# Patient Record
Sex: FEMALE | Race: WHITE | Employment: FULL TIME | URBAN - METROPOLITAN AREA
[De-identification: names, ages, dates, MRNs, and addresses within clinical notes are randomized per-mention and may not be internally consistent; named-entity substitution may affect disease eponyms.]

---

## 2024-10-29 ENCOUNTER — LAB ENCOUNTER (OUTPATIENT)
Dept: LAB | Facility: HOSPITAL | Age: 34
End: 2024-10-29
Attending: NURSE PRACTITIONER
Payer: COMMERCIAL

## 2024-10-29 DIAGNOSIS — Z01.818 PRE-OP TESTING: ICD-10-CM

## 2024-10-29 LAB
ANTIBODY SCREEN: NEGATIVE
BASOPHILS # BLD AUTO: 0.02 X10(3) UL (ref 0–0.2)
BASOPHILS NFR BLD AUTO: 0.3 %
DEPRECATED RDW RBC AUTO: 41.7 FL (ref 35.1–46.3)
EOSINOPHIL # BLD AUTO: 0.19 X10(3) UL (ref 0–0.7)
EOSINOPHIL NFR BLD AUTO: 2.5 %
ERYTHROCYTE [DISTWIDTH] IN BLOOD BY AUTOMATED COUNT: 11.9 % (ref 11–15)
HCT VFR BLD AUTO: 36.9 %
HGB BLD-MCNC: 12.1 G/DL
IMM GRANULOCYTES # BLD AUTO: 0.01 X10(3) UL (ref 0–1)
IMM GRANULOCYTES NFR BLD: 0.1 %
LYMPHOCYTES # BLD AUTO: 3.21 X10(3) UL (ref 1–4)
LYMPHOCYTES NFR BLD AUTO: 42 %
MCH RBC QN AUTO: 30.8 PG (ref 26–34)
MCHC RBC AUTO-ENTMCNC: 32.8 G/DL (ref 31–37)
MCV RBC AUTO: 93.9 FL
MONOCYTES # BLD AUTO: 0.59 X10(3) UL (ref 0.1–1)
MONOCYTES NFR BLD AUTO: 7.7 %
NEUTROPHILS # BLD AUTO: 3.62 X10 (3) UL (ref 1.5–7.7)
NEUTROPHILS # BLD AUTO: 3.62 X10(3) UL (ref 1.5–7.7)
NEUTROPHILS NFR BLD AUTO: 47.4 %
PLATELET # BLD AUTO: 259 10(3)UL (ref 150–450)
RBC # BLD AUTO: 3.93 X10(6)UL
RH BLOOD TYPE: POSITIVE
WBC # BLD AUTO: 7.6 X10(3) UL (ref 4–11)

## 2024-10-29 PROCEDURE — 36415 COLL VENOUS BLD VENIPUNCTURE: CPT

## 2024-10-29 PROCEDURE — 86850 RBC ANTIBODY SCREEN: CPT

## 2024-10-29 PROCEDURE — 86901 BLOOD TYPING SEROLOGIC RH(D): CPT

## 2024-10-29 PROCEDURE — 86900 BLOOD TYPING SEROLOGIC ABO: CPT

## 2024-10-29 PROCEDURE — 85025 COMPLETE CBC W/AUTO DIFF WBC: CPT

## 2024-10-29 NOTE — DISCHARGE INSTRUCTIONS
Restrictions -  Pelvic rest for 1 week - no intercourse, tampon use, baths or swimming    For pain -   Use Motrin/Advil up to 600mg every 6 hours  Can also use Tylenol 500-1000mg every 6 hours       Expect light to medium vaginal bleeding/watery yellowish discharge for up to 7-10 days    Call office at [phone] if any fevers, foul smelling discharge from incisions, heavy vaginal bleeding or concerns.               CIRUGIA AMBULATORIA: INSTRUCCIONES DESPUES DE BARON RECIBIDO ANESTESIA  Debido a la Anestesia y a las medicamentos que se le aplicaron chichi la cirugia, jina reflejos y capacidaddiscernimiento pueden verse afectados. Tambien podria tener un poco de mareo.Aparte de siguir las precauciones de sentico comun, le recomendamos lo siguiente:     El paciente debe estar acompañado por alguien hasta la mañana siguiente.     No maneje ningun vehiculo automotor ni monte bicicleta.     No tome ninguna decision importante melanie por ejemplo firmar de documentos importantes.     No opere herramientas electricas ni electrodomesticos, tales melanie cuchillos electricos, batidoras electricas o serruchos electricos. No kvng el cesped con cortadoras electricas. No practique deportes.     No anthony ejercicio.     Para evitar las nauseas, coma menos de lo normal mas o menos la mitad de lo habitual y/o juliocesar solo liquidos hasta la manana siguiente. Consulte con stanley doctor si esta llevando ángel dieta especial.     No tome bebidas alcoholicas, tranquilizantes, pastilles para dormir etc., y verifique con stanley doctor acerca de cualquier medicamento que kapil tomando actualmente.     El efecto de la medicación usada en stanley anestesia habra pasado delores por completo a la medianoche. Por lo tanto, puede reanudar jina habitos cotidianos en la mañana.     Los adultos deben descansar lo brandy posible por las siguientes 24 horas. Los niños deben permanecer en cama lo brandy posible por las siguientes 24 horas.     Si se presenta cualquier problema,  puede llamar a stanley propio doctor personal o aceda al centro de [facility].    Si sigue estas instrucciones, se sentira major y estara mas seguro despues de stanley cirugia ambulatoria. Sitiene cualquier pregunta, llame al hospital y pida que lo comuniquen con la enfermera de cirugia [phone], extension [phone].    Instrucciones para el patsy: después de stanley cirugía   Acaba de someterse a ángel cirugía. Chichi la cirugía, le administraron un tipo de medicamento llamado anestesia para que esté relajado y no sienta dolor. Después de la cirugía, malcolm vez sienta algo de dolor o náuseas. [place] es común. Estos son algunos consejos para sentirse mejor y recuperarse gale después de la cirugía.   El regreso a casa  Stanley proveedor de atención médica le enseñará cómo cuidarse cuando regrese a stanley casa. También responderá jina preguntas. Pida a un familiar o amigo adulto que lo conduzca a stanley casa. Chichi las primeras 24 horas después de la cirugía, siga estas recomendaciones:   No conduzca ni use maquinaria pesada.  No tome decisiones importantes ni firme ningún documento legal.  Adminístrese los medicamentos según las indicaciones.  Evite el consumo de alcohol.  Si es necesario, coordine para que alguien se quede con usted. Esta persona puede vigilar cualquier problema que se presente y lo ayudará a permanecer seguro.  Asegúrese de asistir a todas jina visitas de control con stanley proveedor de atención médica. Y descanse después de la cirugía chichi el tiempo que le indique stanley proveedor.   Cómo sobrellevar el dolor  Si siente dolor después de la cirugía, los analgésicos lo ayudarán a sentirse mejor. [place] los analgésicos según las indicaciones, antes de que el dolor se intensifique. Además, pregunte a stanley proveedor de atención médica o al farmacéutico acerca de otras formas de controlar el dolor. Estas podrían incluir aplicar calor o hielo, o hacer ejercicios de relajación. Y siga todas las  instrucciones que le dé stanley cirujano o enfermero.      [place]um[place]a el cronograma de jina medicamentos.     Consejos para levon analgésicos  Para aliviar el dolor lo steffanie posible, recuerde estos puntos:   Los analgésicos pueden causar malestar estomacal. Tomarlos con un poco de comida puede aliviar celestina efecto.  La mayoría de los calmantes que se teri por la boca necesitan por lo menos de 20 a 30 minutos para surtir efecto.  No espere hasta que stanley dolor se vuelva intenso para levon el analgésico que le indicaron. Intente que el momento en que puede levon stanley medicamento coincida con otra actividad. Celestina podría ser el momento antes de vestirse, salomón un paseo o sentarse a la myers para cenar.  El estreñimiento es un efecto secundario frecuente de algunos analgésicos. Consulte a stanley proveedor de atención médica antes de usar cualquier medicamento, melanie laxantes o ablandadores de heces, para ayudar a aliviar el estreñimiento. También consulte si es preciso evitar algún tipo de alimento. Levon mucha cantidad de líquido y comer alimentos melanie frutas y verduras con alto contenido de fibra también puede ser beneficioso. Recuerde que no debe levon laxantes a menos que stanley cirujano se los indique.  Mezclar bebidas alcohólicas y analgésicos puede causar mareos y enlentecer stanley respiración. Y hasta puede ser mortal. No juliocesar alcohol mientras esté tomando calmantes.  Los analgésicos pueden hacer que tenga reacciones más lentas. No conduzca ni opere maquinaria mientras esté tomando analgésicos.  Stanley proveedor de atención médica puede indicarle que tome acetaminofén (paracetamol) para ayudar a aliviar el dolor. Pregúntele qué cantidad debe levon por día. El acetaminofén y otros analgésicos pueden interactuar con jina medicamentos recetados u otros medicamentos de venta jitendra (OTC, por jina siglas en inglés). Algunos medicamentos recetados contienen acetaminofén y otros ingredientes. Combinar medicamentos recetados y acetaminofén de venta jitendra  para aliviar el dolor puede provocarle ángel sobredosis accidental. [place] atentamente la etiqueta del envase de jina medicamentos OTC. [place] lo ayudará a saber con exactitud la lista de ingredientes, la cantidad que debe marjorie y cualquier advertencia. [place] también puede ayudarlo a evitar marjorie demasiado acetaminofén. Si tiene preguntas o no entiende la información, pídale a stanley farmacéutico o proveedor de atención médica que se la explique antes de marjorie el medicamento OTC.   Manejo de las náuseas  Algunas personas pueden sentir malestar estomacal (náuseas) después de la cirugía. [place] suele suceder debido a la anestesia, el dolor, los analgésicos, la disminución del movimiento de la comida en el estómago o el estrés de la cirugía. Estos consejos lo ayudarán a manejar las náuseas y a comer alimentos más saludables mientras se recupera. Si seguía un plan alimentario especial antes de la cirugía, pregúntele a stanley proveedor de atención médica si debe continuarlo mientras se recupera. Consulte con stanley proveedor cómo debería continuar stanley alimentación. Esta puede variar según el tipo de cirugía a la que se sometió. Los siguientes consejos generales pueden serle útiles:   No se fuerce a comer. [place] por stanley cuerpo para saber cuándo comer y qué cantidad.  Comience con líquidos transparentes y sopa. Estos son más fáciles de digerir.  Tan pronto melanie se sienta listo, intente comer alimentos semisólidos. Estos incluyen puré de babita, puré de manzana y gelatina.  Lentamente, pase a alimentos sólidos. Al principio no coma alimentos grasosos, pesados ni condimentados.  No se fuerce a hacer aamir comidas grandes al día. En cambio, coma cantidades pequeñas, paul con mayor frecuencia.  [place] los analgésicos con ángel pequeña cantidad de alimentos sólidos, melanie galletas saladas o ángel tostada. [place] ayuda a prevenir las náuseas.  Cuándo llamar a stanley proveedor de atención médica   Llame de inmediato a stanley proveedor de atención médica si nota alguno de  los siguientes síntomas:   Sigue teniendo mucho dolor, o el dolor empeora, después de marjorie el medicamento. Puede que el medicamento no sea lo suficientemente kenney. O gale, puede annetta complicaciones de la cirugía.  Se siente demasiado somnoliento, mareado o adormecido. Quizás el medicamento sea demasiado kenney.  Tiene efectos secundarios, melanie náuseas o vómitos. Stanley proveedor de atención médica puede recomendarle marjorie otros medicamentos.  Tiene cambios en la piel, melanie sarpullido, picazón o urticaria. [place] puede significar que tiene ángel reacción alérgica. Stanley proveedor puede recomendarle marjorie otros medicamentos.  La incisión tiene un aspecto diferente (por ejemplo, se abre ángel parte).  Tiene sangrado o supuración de líquido de la herida y no le dijeron que eso era esperable.  Fiebre de 100.4 °F (38 °C) o más, o según le indique stanley proveedor.  Cuándo llamar al 911  Llame al  911  de inmediato si tiene:   Dificultad para respirar  Teressa hinchada    Si tiene apnea del sueño obstructiva   [place] la cirugía, le administraron anestesia para que esté cómodo y no sienta dolor. Después de la cirugía, es probable que tenga más ataques de apnea causados por la anestesia y otros medicamentos que le administraron. Los ataques pueden durar más de lo habitual.    En stanley casa, anthony lo siguiente:  Cuando duerma, siga usando stanley dispositivo de presión positiva continua en las vías respiratorias (CPAP, por jina siglas en inglés). A menos que stanley proveedor de atención médica le indique lo contrario, úselo siempre que duerma, ya sea de día o de noche. El dispositivo de CPAP suele usarse para tratar la apnea obstructiva del sueño.  Consulte a stanley proveedor antes de marjorie cualquier analgésico, relajante muscular o sedante. Stanley proveedor le dará información sobre los peligros de marjorie estos medicamentos.  Comuníquese con stanley proveedor si tiene el sueño demasiado alterado, incluso cuando esté tomando los medicamentos según las  instrucciones.  © 6819-0667 [employer]. Todos los derechos reservados. Esta información no pretende sustituir la atención médica profesional. Sólo stanley médico puede diagnosticar y tratar un problema de pan.

## 2024-10-29 NOTE — PAT NURSING NOTE
Spoke with Helen at surgeon office regarding patient desire to change surgery date. Office will follow up with patient.

## 2024-10-30 ENCOUNTER — ANESTHESIA (OUTPATIENT)
Dept: SURGERY | Facility: HOSPITAL | Age: 34
End: 2024-10-30
Payer: COMMERCIAL

## 2024-10-30 ENCOUNTER — HOSPITAL ENCOUNTER (OUTPATIENT)
Facility: HOSPITAL | Age: 34
Setting detail: HOSPITAL OUTPATIENT SURGERY
Discharge: HOME | End: 2024-10-30
Attending: OBSTETRICS & GYNECOLOGY | Admitting: OBSTETRICS & GYNECOLOGY
Payer: COMMERCIAL

## 2024-10-30 ENCOUNTER — ANESTHESIA EVENT (OUTPATIENT)
Dept: SURGERY | Facility: HOSPITAL | Age: 34
End: 2024-10-30
Payer: COMMERCIAL

## 2024-10-30 VITALS
RESPIRATION RATE: 14 BRPM | OXYGEN SATURATION: 100 % | HEART RATE: 76 BPM | HEIGHT: 63.78 IN | SYSTOLIC BLOOD PRESSURE: 106 MMHG | BODY MASS INDEX: 23.68 KG/M2 | TEMPERATURE: 99 F | DIASTOLIC BLOOD PRESSURE: 67 MMHG | WEIGHT: 137 LBS

## 2024-10-30 DIAGNOSIS — Z01.818 PRE-OP TESTING: Primary | ICD-10-CM

## 2024-10-30 PROBLEM — O02.1 MISSED ABORTION (HCC): Status: ACTIVE | Noted: 2024-10-30

## 2024-10-30 PROBLEM — O02.1 MISSED ABORTION (HCC): Status: RESOLVED | Noted: 2024-10-30 | Resolved: 2024-10-30

## 2024-10-30 LAB — RH BLOOD TYPE: POSITIVE

## 2024-10-30 PROCEDURE — 10D17ZZ EXTRACTION OF PRODUCTS OF CONCEPTION, RETAINED, VIA NATURAL OR ARTIFICIAL OPENING: ICD-10-PCS | Performed by: OBSTETRICS & GYNECOLOGY

## 2024-10-30 PROCEDURE — 88305 TISSUE EXAM BY PATHOLOGIST: CPT | Performed by: OBSTETRICS & GYNECOLOGY

## 2024-10-30 RX ORDER — CALCIUM POLYCARBOPHIL 625 MG
TABLET ORAL
COMMUNITY

## 2024-10-30 RX ORDER — HYDROMORPHONE HYDROCHLORIDE 1 MG/ML
0.4 INJECTION, SOLUTION INTRAMUSCULAR; INTRAVENOUS; SUBCUTANEOUS EVERY 5 MIN PRN
Status: DISCONTINUED | OUTPATIENT
Start: 2024-10-30 | End: 2024-10-30

## 2024-10-30 RX ORDER — HYDROMORPHONE HYDROCHLORIDE 1 MG/ML
0.6 INJECTION, SOLUTION INTRAMUSCULAR; INTRAVENOUS; SUBCUTANEOUS EVERY 5 MIN PRN
Status: DISCONTINUED | OUTPATIENT
Start: 2024-10-30 | End: 2024-10-30

## 2024-10-30 RX ORDER — MIDAZOLAM HYDROCHLORIDE 1 MG/ML
INJECTION INTRAMUSCULAR; INTRAVENOUS AS NEEDED
Status: DISCONTINUED | OUTPATIENT
Start: 2024-10-30 | End: 2024-10-30 | Stop reason: SURG

## 2024-10-30 RX ORDER — NALOXONE HYDROCHLORIDE 0.4 MG/ML
0.08 INJECTION, SOLUTION INTRAMUSCULAR; INTRAVENOUS; SUBCUTANEOUS AS NEEDED
Status: DISCONTINUED | OUTPATIENT
Start: 2024-10-30 | End: 2024-10-30

## 2024-10-30 RX ORDER — LIDOCAINE HYDROCHLORIDE 10 MG/ML
INJECTION, SOLUTION EPIDURAL; INFILTRATION; INTRACAUDAL; PERINEURAL AS NEEDED
Status: DISCONTINUED | OUTPATIENT
Start: 2024-10-30 | End: 2024-10-30 | Stop reason: SURG

## 2024-10-30 RX ORDER — MORPHINE SULFATE 4 MG/ML
2 INJECTION, SOLUTION INTRAMUSCULAR; INTRAVENOUS EVERY 10 MIN PRN
Status: DISCONTINUED | OUTPATIENT
Start: 2024-10-30 | End: 2024-10-30

## 2024-10-30 RX ORDER — ONDANSETRON 2 MG/ML
INJECTION INTRAMUSCULAR; INTRAVENOUS AS NEEDED
Status: DISCONTINUED | OUTPATIENT
Start: 2024-10-30 | End: 2024-10-30 | Stop reason: SURG

## 2024-10-30 RX ORDER — HYDROMORPHONE HYDROCHLORIDE 1 MG/ML
0.2 INJECTION, SOLUTION INTRAMUSCULAR; INTRAVENOUS; SUBCUTANEOUS EVERY 5 MIN PRN
Status: DISCONTINUED | OUTPATIENT
Start: 2024-10-30 | End: 2024-10-30

## 2024-10-30 RX ORDER — SODIUM CHLORIDE, SODIUM LACTATE, POTASSIUM CHLORIDE, CALCIUM CHLORIDE 600; 310; 30; 20 MG/100ML; MG/100ML; MG/100ML; MG/100ML
INJECTION, SOLUTION INTRAVENOUS CONTINUOUS
Status: DISCONTINUED | OUTPATIENT
Start: 2024-10-30 | End: 2024-10-30

## 2024-10-30 RX ORDER — KETOROLAC TROMETHAMINE 30 MG/ML
INJECTION, SOLUTION INTRAMUSCULAR; INTRAVENOUS AS NEEDED
Status: DISCONTINUED | OUTPATIENT
Start: 2024-10-30 | End: 2024-10-30 | Stop reason: SURG

## 2024-10-30 RX ORDER — MORPHINE SULFATE 4 MG/ML
4 INJECTION, SOLUTION INTRAMUSCULAR; INTRAVENOUS EVERY 10 MIN PRN
Status: DISCONTINUED | OUTPATIENT
Start: 2024-10-30 | End: 2024-10-30

## 2024-10-30 RX ORDER — DEXAMETHASONE SODIUM PHOSPHATE 4 MG/ML
VIAL (ML) INJECTION AS NEEDED
Status: DISCONTINUED | OUTPATIENT
Start: 2024-10-30 | End: 2024-10-30 | Stop reason: SURG

## 2024-10-30 RX ORDER — ACETAMINOPHEN 500 MG
500 TABLET ORAL EVERY 6 HOURS PRN
COMMUNITY
End: 2024-10-30

## 2024-10-30 RX ORDER — ACETAMINOPHEN 500 MG
1000 TABLET ORAL ONCE
Status: COMPLETED | OUTPATIENT
Start: 2024-10-30 | End: 2024-10-30

## 2024-10-30 RX ORDER — MORPHINE SULFATE 10 MG/ML
6 INJECTION, SOLUTION INTRAMUSCULAR; INTRAVENOUS EVERY 10 MIN PRN
Status: DISCONTINUED | OUTPATIENT
Start: 2024-10-30 | End: 2024-10-30

## 2024-10-30 RX ADMIN — DEXAMETHASONE SODIUM PHOSPHATE 4 MG: 4 MG/ML VIAL (ML) INJECTION at 09:28:00

## 2024-10-30 RX ADMIN — MIDAZOLAM HYDROCHLORIDE 2 MG: 1 INJECTION INTRAMUSCULAR; INTRAVENOUS at 09:13:00

## 2024-10-30 RX ADMIN — KETOROLAC TROMETHAMINE 30 MG: 30 INJECTION, SOLUTION INTRAMUSCULAR; INTRAVENOUS at 09:40:00

## 2024-10-30 RX ADMIN — LIDOCAINE HYDROCHLORIDE 50 MG: 10 INJECTION, SOLUTION EPIDURAL; INFILTRATION; INTRACAUDAL; PERINEURAL at 09:18:00

## 2024-10-30 RX ADMIN — SODIUM CHLORIDE, SODIUM LACTATE, POTASSIUM CHLORIDE, CALCIUM CHLORIDE: 600; 310; 30; 20 INJECTION, SOLUTION INTRAVENOUS at 10:00:00

## 2024-10-30 RX ADMIN — SODIUM CHLORIDE, SODIUM LACTATE, POTASSIUM CHLORIDE, CALCIUM CHLORIDE: 600; 310; 30; 20 INJECTION, SOLUTION INTRAVENOUS at 09:12:00

## 2024-10-30 RX ADMIN — ONDANSETRON 4 MG: 2 INJECTION INTRAMUSCULAR; INTRAVENOUS at 09:28:00

## 2024-10-30 NOTE — ANESTHESIA PROCEDURE NOTES
Airway  Date/Time: 10/30/2024 9:21 AM  Urgency: Elective    Airway not difficult    General Information and Staff    Patient location during procedure: OR  Anesthesiologist: Isidro Sahni MD  Resident/CRNA: Vera Glasgow CRNA  Performed: CRNA   Performed by: Vera Glasgow CRNA  Authorized by: Isidro Sahni MD      Indications and Patient Condition  Indications for airway management: anesthesia  Spontaneous ventilation: present  Sedation level: deep  Preoxygenated: yes  Patient position: sniffing  Mask difficulty assessment: 1 - vent by mask    Final Airway Details  Final airway type: supraglottic airway      Successful airway: classic  Size 4       Number of attempts at approach: 1

## 2024-10-30 NOTE — H&P
[facility]  part of [facility]    History & Physical    Bethany Das Patient Status:  Hospital Outpatient Surgery    11/3/1990 MRN [id number]   Location [facility] PRE OP RECOVERY Attending Margarita Lyle MD   Hosp Day # 0 PCP No primary care provider on file.     Date of Admission:  10/30/2024      HPI:   Bethany Das is a 33 year old  female scheduled for suction Dilation and curettage.     Diagnosis: missed  at 5-6 weeks by ultrasound       History   Obstetric History:   OB History    Para Term  AB Living   1             SAB IAB Ectopic Multiple Live Births                  # Outcome Date GA Lbr Jian/2nd Weight Sex Type Anes PTL Lv   1 Current                  Past Medical History:   Past Medical History:    Visual impairment    Glasses       Past Surgerical History:   Past Surgical History:   Procedure Laterality Date    Implant left      Implant right      Other surgical history  2014    Nose Plastic Surgery       Social History:   Social History     Tobacco Use    Smoking status: Never    Smokeless tobacco: Never   Substance Use Topics    Alcohol use: Not Currently     Comment: Socially        Allergies/Medications:   Allergies:   Allergies[1]    Medications:  Prescriptions Prior to Admission[2]      Review of Systems:   As documented in HPI      Physical Exam:   Temp:  [97.8 °F (36.6 °C)] 97.8 °F (36.6 °C)  Pulse:  [68] 68  Resp:  [16] 16  BP: (96)/(68) 96/68  SpO2:  [100 %] 100 %    Constitutional: alert and cooperative in No distress    Abdomen: soft,  nontender      Neurologic: Alert and oriented  Psychiatric: Cooperative    Results:     Recent Results (from the past 24 hours)   CBC W Differential W Platelet    Collection Time: 10/29/24  6:38 PM   Result Value Ref Range    WBC 7.6 4.0 - 11.0 x10(3) uL    RBC 3.93 3.80 - 5.30 x10(6)uL    HGB 12.1 12.0 - 16.0 g/dL    HCT 36.9 35.0 - 48.0 %    MCV 93.9 80.0 - 100.0 fL    MCH 30.8 26.0 -  34.0 pg    MCHC 32.8 31.0 - 37.0 g/dL    RDW-SD 41.7 35.1 - 46.3 fL    RDW 11.9 11.0 - 15.0 %    .0 150.0 - 450.0 10(3)uL    Neutrophil Absolute Prelim 3.62 1.50 - 7.70 x10 (3) uL    Neutrophil Absolute 3.62 1.50 - 7.70 x10(3) uL    Lymphocyte Absolute 3.21 1.00 - 4.00 x10(3) uL    Monocyte Absolute 0.59 0.10 - 1.00 x10(3) uL    Eosinophil Absolute 0.19 0.00 - 0.70 x10(3) uL    Basophil Absolute 0.02 0.00 - 0.20 x10(3) uL    Immature Granulocyte Absolute 0.01 0.00 - 1.00 x10(3) uL    Neutrophil % 47.4 %    Lymphocyte % 42.0 %    Monocyte % 7.7 %    Eosinophil % 2.5 %    Basophil % 0.3 %    Immature Granulocyte % 0.1 %   ABORH (Blood Type)    Collection Time: 10/29/24  6:38 PM   Result Value Ref Range    ABO BLOOD TYPE A     RH BLOOD TYPE Positive    Antibody Screen    Collection Time: 10/29/24  6:38 PM   Result Value Ref Range    Antibody Screen Negative    ABORH Confirmation    Collection Time: 10/30/24  8:30 AM   Result Value Ref Range    ABO BLOOD TYPE A     RH BLOOD TYPE Positive        No results found.        Assessment/Plan:   JORDAN Das is a 33 year old  female who presents for surgery.    Discussed risks of surgery including bleeding, infection, uterine perforation.   Discussed postop restrictions and recovery   Questions answered.   Consent signed.        Margarita Lyle MD  10/30/2024  8:55 AM       [1] No Known Allergies  [2]   Medications Prior to Admission   Medication Sig Dispense Refill Last Dose/Taking    acetaminophen 500 MG Oral Tab Take 1 tablet (500 mg total) by mouth every 6 (six) hours as needed for Pain.   10/29/2024 Evening    Calcium Polycarbophil (FIBER) 625 MG Oral Tab Take by mouth.   10/29/2024 Morning    Prenatal Multivit-Min-Fe-FA (PRENATAL OR) Take 1 tablet by mouth every morning.   Past Week

## 2024-10-30 NOTE — OPERATIVE REPORT
[facility]  part of [facility]    Operative Note         Bethany Das Location: OR   [facility] [id number] MRN [id number]   Admission Date 10/30/2024 Operation Date 10/30/2024   Attending Physician Margarita Lyle MD       Patient Name: Bethnay Das     Preoperative Diagnosis: Missed      Postoperative Diagnosis: Missed      Procedure(s):  Suction dilation and curettage     Primary Surgeon: Margarita Lyle MD         Assistant: none    Anesthesia: General     EBL: 10cc    Complications: none    Findings: grossly normal cervix, anteverted uterus    Implants/grafts: none     Procedure:     The risks, benefits, and alternatives were explained to the patient and informed consent was obtained. In the operating room general anesthesia was given. She was placed in a mid dorsal lithotomy position and prepped and draped in a sterile fashion. A bivalve speculum was placed vaginally and a tenaculum was placed on the anterior lip of the cervix.     The cervix was dilated to 8mm using Hegar dilators. A 7-sized curved suction curette was used for the procedure.     This was inserted and the products were removed. Two passed were used to remove all of the products. A gentle sharp curettage was then performed and a gritty texture was noted in all quadrants. Excellent hemostasis was noted.     The instruments were removed from the vagina. The patient was repositioned in the supine position and awoke in the OR in stable condition.     Margarita Lyle MD

## 2024-10-30 NOTE — ANESTHESIA PREPROCEDURE EVALUATION
Anesthesia PreOp Note    HPI:     Bethany Das is a 33 year old female who presents for preoperative consultation requested by: Margarita Lyle MD    Date of Surgery: 10/30/2024    Procedure(s):  Suction dilation and curettage  Indication: Missed     Relevant Problems   No relevant active problems       NPO:  Last Liquid Consumption Date: 10/29/24  Last Liquid Consumption Time:   Last Solid Consumption Date: 10/29/24  Last Solid Consumption Time:   Last Liquid Consumption Date: 10/29/24          History Review:  There are no active problems to display for this patient.      Past Medical History:    Visual impairment    Glasses       Past Surgical History:   Procedure Laterality Date    Implant left      Implant right      Other surgical history      Nose Plastic Surgery       Prescriptions Prior to Admission[1]  Current Medications and Prescriptions Ordered in Epic[2]    Allergies[3]    History reviewed. No pertinent family history.  Social History     Socioeconomic History    Marital status:    Tobacco Use    Smoking status: Never    Smokeless tobacco: Never   Vaping Use    Vaping status: Never Used   Substance and Sexual Activity    Alcohol use: Not Currently     Comment: Socially    Drug use: Never       Available pre-op labs reviewed.  Lab Results   Component Value Date    WBC 7.6 10/29/2024    RBC 3.93 10/29/2024    HGB 12.1 10/29/2024    HCT 36.9 10/29/2024    MCV 93.9 10/29/2024    MCH 30.8 10/29/2024    MCHC 32.8 10/29/2024    RDW 11.9 10/29/2024    .0 10/29/2024             Vital Signs:  Body mass index is 23.68 kg/m².   height is 1.62 m (5' 3.78\") and weight is 62.1 kg (137 lb). Her oral temperature is 97.8 °F (36.6 °C). Her blood pressure is 96/68 and her pulse is 68. Her respiration is 16 and oxygen saturation is 100%.   Vitals:    10/29/24 1121 10/30/24 0751   BP:  96/68   Pulse:  68   Resp:  16   Temp:  97.8 °F (36.6 °C)   TempSrc:  Oral   SpO2:  100%    Weight: 62.6 kg (138 lb) 62.1 kg (137 lb)   Height: 1.613 m (5' 3.5\") 1.62 m (5' 3.78\")        Anesthesia Evaluation     Patient summary reviewed and Nursing notes reviewed    No history of anesthetic complications   Airway   Mallampati: II  TM distance: >3 FB  Neck ROM: full  Dental - Dentition appears grossly intact     Pulmonary - negative ROS and normal exam   Cardiovascular - negative ROS and normal exam    Neuro/Psych - negative ROS     GI/Hepatic/Renal - negative ROS     Endo/Other - negative ROS   Abdominal                  Anesthesia Plan:   ASA:  2  Plan:   General  Airway:  LMA  Plan Comments: Early SAB, approx 7 wks pregnant - proceed GA with LMA  Informed Consent Plan and Risks Discussed With:  Patient and spouse  Discussed plan with:  CRNA      I have informed Bethany Das and/or legal guardian or family member of the nature of the anesthetic plan, benefits, risks including possible dental damage if relevant, major complications, and any alternative forms of anesthetic management.   All of the patient's questions were answered to the best of my ability. The patient desires the anesthetic management as planned.  Isidro Sahni MD  10/30/2024 7:57 AM  Present on Admission:  **None**           [1]   Medications Prior to Admission   Medication Sig Dispense Refill Last Dose/Taking    acetaminophen 500 MG Oral Tab Take 1 tablet (500 mg total) by mouth every 6 (six) hours as needed for Pain.   10/29/2024 Evening    Calcium Polycarbophil (FIBER) 625 MG Oral Tab Take by mouth.   10/29/2024 Morning    Prenatal Multivit-Min-Fe-FA (PRENATAL OR) Take 1 tablet by mouth every morning.   Past Week   [2]   Current Facility-Administered Medications Ordered in Epic   Medication Dose Route Frequency Provider Last Rate Last Admin    lactated ringers infusion   Intravenous Continuous Margarita Lyle MD        acetaminophen (Tylenol Extra Strength) tab 1,000 mg  1,000 mg Oral Once Margarita Lyle MD         doxycycline hyclate (Vibramycin) 200 mg in sodium chloride 0.9% 250 mL IVPB  200 mg Intravenous Once Margarita Lyle MD         No current Epic-ordered outpatient medications on file.   [3] No Known Allergies

## 2024-10-30 NOTE — ANESTHESIA POSTPROCEDURE EVALUATION
Patient: Bethany Das    Procedure Summary       Date: 10/30/24 Room / Location: [facility] MAIN OR  / [facility] MAIN OR    Anesthesia Start: 912 Anesthesia Stop:     Procedure: Suction dilation and curettage (Vagina ) Diagnosis: (Missed )    Surgeons: Margarita Lyle MD Anesthesiologist: Isidro Sahni MD    Anesthesia Type: general ASA Status: 2            Anesthesia Type: general    Vitals Value Taken Time   /68 10/30/24 1001   Temp 98.1 °F (36.7 °C) 10/30/24 1001   Pulse 77 10/30/24 1001   Resp 11 10/30/24 1001   SpO2 99 % 10/30/24 1001       [facility] AN Post Evaluation:   Patient Evaluated in PACU  Patient Participation: complete - patient participated  Level of Consciousness: sleepy but conscious  Pain Management: adequate  Airway Patency:patent  Yes    Nausea/Vomiting: none  Cardiovascular Status: acceptable  Respiratory Status: acceptable and room air  Postoperative Hydration acceptable      Vera Glasgow CRNA  10/30/2024 10:01 AM

## 2024-11-06 ENCOUNTER — TELEPHONE (OUTPATIENT)
Dept: OBGYN UNIT | Facility: HOSPITAL | Age: 34
End: 2024-11-06

## (undated) DEVICE — SOLUTION IRRIG 1000ML 0.9% NACL USP BTL

## (undated) DEVICE — SUCTION CANISTER, 3000CC,SAFELINER: Brand: DEROYAL

## (undated) DEVICE — JELLY,LUBE,STERILE,FLIP TOP,TUBE,2-OZ: Brand: MEDLINE

## (undated) DEVICE — GLOVE SUR 7 SENSICARE PI MIC PIP CRM PWD F

## (undated) DEVICE — GLOVE SUR 7.5 SENSICARE PI PIP GRN PWD F

## (undated) DEVICE — SYSTEM COLL CANSTR LID SEAL CAP W/O TISS TRAP

## (undated) DEVICE — PACK CUSTOM D

## (undated) DEVICE — SET COLL TBNG 3/8INX6FT W/ INTEGR SWVL

## (undated) DEVICE — HANDLE SUR BLU PLAS LT FLX SLIP ON ST DISP

## (undated) DEVICE — Device: Brand: JELCO

## (undated) DEVICE — SYSTEM COLL W/ TISS TRAP INCLUDE COLL CANSTR

## (undated) DEVICE — CURETTE VAC 7MM RIG CRV DISP